# Patient Record
Sex: FEMALE | Race: WHITE | Employment: OTHER | URBAN - METROPOLITAN AREA
[De-identification: names, ages, dates, MRNs, and addresses within clinical notes are randomized per-mention and may not be internally consistent; named-entity substitution may affect disease eponyms.]

---

## 2017-01-26 ENCOUNTER — OFFICE VISIT (OUTPATIENT)
Dept: INTERNAL MEDICINE CLINIC | Facility: CLINIC | Age: 77
End: 2017-01-26

## 2017-01-26 VITALS
TEMPERATURE: 98 F | DIASTOLIC BLOOD PRESSURE: 64 MMHG | OXYGEN SATURATION: 99 % | RESPIRATION RATE: 16 BRPM | BODY MASS INDEX: 30.99 KG/M2 | HEIGHT: 65 IN | SYSTOLIC BLOOD PRESSURE: 118 MMHG | HEART RATE: 80 BPM | WEIGHT: 186 LBS

## 2017-01-26 DIAGNOSIS — R05.8 COUGH PRESENT FOR GREATER THAN 3 WEEKS: Primary | ICD-10-CM

## 2017-01-26 PROCEDURE — 99213 OFFICE O/P EST LOW 20 MIN: CPT | Performed by: INTERNAL MEDICINE

## 2017-01-26 RX ORDER — FLUTICASONE PROPIONATE 50 MCG
2 SPRAY, SUSPENSION (ML) NASAL DAILY
Qty: 1 BOTTLE | Refills: 0 | Status: SHIPPED | OUTPATIENT
Start: 2017-01-26 | End: 2017-03-01

## 2017-01-26 RX ORDER — BENZONATATE 200 MG/1
200 CAPSULE ORAL 3 TIMES DAILY
Qty: 42 CAPSULE | Refills: 0 | Status: SHIPPED | OUTPATIENT
Start: 2017-01-26 | End: 2017-03-01

## 2017-01-26 RX ORDER — DOXEPIN HYDROCHLORIDE 50 MG/1
1 CAPSULE ORAL DAILY
COMMUNITY

## 2017-01-26 RX ORDER — PANTOPRAZOLE SODIUM 40 MG/1
40 TABLET, DELAYED RELEASE ORAL
Qty: 30 TABLET | Refills: 0 | Status: SHIPPED | OUTPATIENT
Start: 2017-01-26 | End: 2017-03-01 | Stop reason: ALTCHOICE

## 2017-01-26 NOTE — PATIENT INSTRUCTIONS
Use nasal spray each nostril daily, use after you gently clear your nose at its most watery (usually in morning after moving around or after shower)

## 2017-01-26 NOTE — PROGRESS NOTES
Silvia Crandall is a 78year old female  Patient presents with:  Cough      HPI:   Coughing for months, has SS Chest pressure after coughing, dry, morning and evening, can hurt in the chest when bad  CXR in September negative. No wheezing or heartburn. occ D well nourished,in no apparent distress  SKIN: no rashes,no suspicious lesions  HEENT: atraumatic, normocephalic,ears and throat are clear, no pallor or icterus  NECK: supple,no adenopathy,no bruits, no TM  LUNGS: clear to auscultation and percussion  CARDI

## 2017-02-21 RX ORDER — PANTOPRAZOLE SODIUM 40 MG/1
TABLET, DELAYED RELEASE ORAL
Qty: 30 TABLET | Refills: 0 | OUTPATIENT
Start: 2017-02-21

## 2017-03-01 ENCOUNTER — PATIENT MESSAGE (OUTPATIENT)
Dept: INTERNAL MEDICINE CLINIC | Facility: CLINIC | Age: 77
End: 2017-03-01

## 2017-03-01 NOTE — TELEPHONE ENCOUNTER
From: Nikolas Michael  To: Mel Oscar MD  Sent: 3/1/2017 2:35 PM CST  Subject: Other    The referenced drug renewal was a mistake by [facility]. I did not request a renewal.  Matheus Andino

## 2017-03-15 ENCOUNTER — PATIENT MESSAGE (OUTPATIENT)
Dept: INTERNAL MEDICINE CLINIC | Facility: CLINIC | Age: 77
End: 2017-03-15

## 2017-03-15 DIAGNOSIS — Z12.31 ENCOUNTER FOR SCREENING MAMMOGRAM FOR BREAST CANCER: Primary | ICD-10-CM

## 2017-03-15 NOTE — TELEPHONE ENCOUNTER
From: Queta Pink  To: Rajat Blunt MD  Sent: 3/15/2017 1:09 PM CDT  Subject: Other    Please send a script for a mammogram to [address] [place] [address].  Thanks,  Ady Winters

## 2017-03-17 NOTE — TELEPHONE ENCOUNTER
See pt email and reply. Advised her to schedule AWV or Medicare Breast/Pelvic for Mammo order. Asking for mammogram.  Last mammo 4/16/15. Last Medicare Breast/Pelvic 3/30/15 (pt refused physical exam). All recent visits are acute issues.   Please advise

## 2017-03-17 NOTE — TELEPHONE ENCOUNTER
Call patient please. Patient has not been in for wellness since 2015, its in her best interest to be seen, visit is free and we will go over medical conditions, vaccines and make sure she is UTD on everything.  If pt still refuses she can have mammogram scr

## 2017-03-21 NOTE — TELEPHONE ENCOUNTER
Spoke to pt. Discussed below notes and importance of AWV for preventative care. Pt strongly declines make AWV. Noted mammogram order placed. Pt voiced understanding.

## 2017-04-10 ENCOUNTER — HOSPITAL ENCOUNTER (OUTPATIENT)
Dept: MAMMOGRAPHY | Age: 77
Discharge: HOME | End: 2017-04-10
Attending: INTERNAL MEDICINE
Payer: MEDICARE

## 2017-04-10 DIAGNOSIS — Z12.31 ENCOUNTER FOR SCREENING MAMMOGRAM FOR BREAST CANCER: ICD-10-CM

## 2017-04-10 PROCEDURE — 77067 SCR MAMMO BI INCL CAD: CPT

## 2018-03-09 ENCOUNTER — TELEPHONE (OUTPATIENT)
Dept: INTERNAL MEDICINE CLINIC | Facility: CLINIC | Age: 78
End: 2018-03-09

## 2018-03-09 NOTE — TELEPHONE ENCOUNTER
Incoming (mail or fax): Fax  Received from: [id number] [facility] Record Release  Documentation given to:  Ezra's medical record bin!

## 2018-03-14 NOTE — TELEPHONE ENCOUNTER
Medical records request rec'd from [address] [place] [address] for records dated recent H&P,Xray,lab reports. Forwarded to [facility] 3-14-18. See med rec scan dated 3-14-18.

## 2019-07-09 ENCOUNTER — TELEPHONE (OUTPATIENT)
Dept: INTERNAL MEDICINE CLINIC | Facility: CLINIC | Age: 79
End: 2019-07-09

## 2021-03-09 DIAGNOSIS — Z23 NEED FOR VACCINATION: ICD-10-CM

## (undated) NOTE — MR AVS SNAPSHOT
[address] [address] [phone]               Thank you for choosing us for your health care visit with Tia Rodrigues MD.  We are glad to serve you and happy to provid Phone:  [phone]    - benzonatate 200 MG Caps  - Fluticasone Propionate 50 MCG/ACT Susp  - Pantoprazole Sodium 40 MG Tbec            Follow-up Instructions     Return in about 2 weeks (around 2/9/2017) for cough.          MyChart     Visit Liyah Sanders